# Patient Record
Sex: MALE | Race: WHITE | NOT HISPANIC OR LATINO | URBAN - METROPOLITAN AREA
[De-identification: names, ages, dates, MRNs, and addresses within clinical notes are randomized per-mention and may not be internally consistent; named-entity substitution may affect disease eponyms.]

---

## 2017-08-13 ENCOUNTER — OFFICE VISIT (OUTPATIENT)
Dept: URGENT CARE | Facility: URGENT CARE | Age: 32
End: 2017-08-13
Payer: COMMERCIAL

## 2017-08-13 VITALS
SYSTOLIC BLOOD PRESSURE: 124 MMHG | HEART RATE: 60 BPM | OXYGEN SATURATION: 97 % | DIASTOLIC BLOOD PRESSURE: 84 MMHG | WEIGHT: 256 LBS | RESPIRATION RATE: 16 BRPM | TEMPERATURE: 97.7 F

## 2017-08-13 DIAGNOSIS — H65.193 ACUTE EFFUSION OF BOTH MIDDLE EARS: Primary | ICD-10-CM

## 2017-08-13 PROCEDURE — 99202 OFFICE O/P NEW SF 15 MIN: CPT | Performed by: PHYSICIAN ASSISTANT

## 2017-08-13 RX ORDER — ALBUTEROL SULFATE 90 UG/1
2 AEROSOL, METERED RESPIRATORY (INHALATION) EVERY 6 HOURS
COMMUNITY

## 2017-08-13 ASSESSMENT — ENCOUNTER SYMPTOMS
FEVER: 0
CHILLS: 0
NAUSEA: 0
ABDOMINAL PAIN: 0
SHORTNESS OF BREATH: 0
VOMITING: 0
HEADACHES: 0
DIARRHEA: 0
FOCAL WEAKNESS: 0

## 2017-08-13 NOTE — MR AVS SNAPSHOT
"              After Visit Summary   2017    Alen Matthews    MRN: [id number]           Patient Information     Date Of Birth          1985        Visit Information        Provider Department      2017 1:25 PM Nicole Sanchez PA-C [facility]        Today's Diagnoses     Acute effusion of both middle ears    -  1       Follow-ups after your visit        Who to contact     If you have questions or need follow up information about today's clinic visit or your schedule please contact [facility] directly at [phone].  Normal or non-critical lab and imaging results will be communicated to you by [facility], letter or phone within 4 business days after the clinic has received the results. If you do not hear from us within 7 days, please contact the clinic through [facility] or phone. If you have a critical or abnormal lab result, we will notify you by phone as soon as possible.  Submit refill requests through [facility] or call your pharmacy and they will forward the refill request to us. Please allow 3 business days for your refill to be completed.          Additional Information About Your Visit        MyChart Information     [facility] lets you send messages to your doctor, view your test results, renew your prescriptions, schedule appointments and more. To sign up, go to [email or web address] . Click on \"Log in\" on the left side of the screen, which will take you to the Welcome page. Then click on \"Sign up Now\" on the right side of the page.     You will be asked to enter the access code listed below, as well as some personal information. Please follow the directions to create your username and password.     Your access code is: [email or web address]  Expires: 2017  1:51 PM     Your access code will  in 90 days. If you need help or a new code, please call your [place] clinic or [phone].        Care EveryWhere ID     This is your Care " EveryWhere ID. This could be used by other organizations to access your [facility] medical records  [id number]        Your Vitals Were     Pulse Temperature Respirations Pulse Oximetry          60 97.7  F (36.5  C) (Oral) 16 97%         Blood Pressure from Last 3 Encounters:   08/13/17 124/84    Weight from Last 3 Encounters:   08/13/17 256 lb (116.1 kg)              Today, you had the following     No orders found for display       Primary Care Provider Office Phone # Fax #    Irwin Monroy [phone]       [facility] [address] [place] [address]        Equal Access to Services     [facility]: Hadii aad ku hadasho Sodebora, waaxda luqadaha, qaybta kaalmada adezhouyada, mandeep gordon . So [facility] [phone].    ATENCIÓN: Si habla español, tiene a garcia disposición servicios gratuitos de asistencia lingüística. [facility] [phone].    We comply with applicable federal civil rights laws and [place] laws. We do not discriminate on the basis of race, color, national origin, age, disability sex, sexual orientation or gender identity.            Thank you!     Thank you for choosing [facility]  for your care. Our goal is always to provide you with excellent care. Hearing back from our patients is one way we can continue to improve our services. Please take a few minutes to complete the written survey that you may receive in the mail after your visit with us. Thank you!             Your Updated Medication List - Protect others around you: Learn how to safely use, store and throw away your medicines at www.disposemymeds.org.          This list is accurate as of: 8/13/17  1:51 PM.  Always use your most recent med list.                   Brand Name Dispense Instructions for use Diagnosis    albuterol 108 (90 BASE) MCG/ACT Inhaler    PROAIR HFA/PROVENTIL HFA/VENTOLIN HFA     Inhale 2 puffs into the lungs every 6 hours        OMEPRAZOLE  PO      Take 20 mg by mouth        SINGULAIR PO      Take 10 mg by mouth

## 2017-08-13 NOTE — NURSING NOTE
Chief Complaint   Patient presents with     Urgent Care     Ear Problem     X 1 day painful ears ache      /84  Pulse 60  Temp 97.7  F (36.5  C) (Oral)  Resp 16  Wt 256 lb (116.1 kg)  SpO2 97% There is no height or weight on file to calculate BMI.  bp completed using cuff size: regular       Health Maintenance addressed:  NONE    n/a    Kitty Fuentes MA

## 2018-06-22 ENCOUNTER — RECORDS - HEALTHEAST (OUTPATIENT)
Dept: LAB | Facility: CLINIC | Age: 33
End: 2018-06-22

## 2018-06-22 LAB
CHOLEST SERPL-MCNC: 205 MG/DL
FASTING STATUS PATIENT QL REPORTED: NO
HDLC SERPL-MCNC: 48 MG/DL
LDLC SERPL CALC-MCNC: 133 MG/DL
TRIGL SERPL-MCNC: 118 MG/DL

## 2019-12-06 ENCOUNTER — AMBULATORY - HEALTHEAST (OUTPATIENT)
Dept: MATERNAL FETAL MEDICINE | Facility: HOSPITAL | Age: 34
End: 2019-12-06

## 2019-12-06 ENCOUNTER — AMBULATORY - HEALTHEAST (OUTPATIENT)
Dept: LAB | Facility: HOSPITAL | Age: 34
End: 2019-12-06

## 2019-12-06 DIAGNOSIS — Z31.440 ENCOUNTER OF MALE FOR TESTING FOR GENETIC DISEASE CARRIER STATUS FOR PROCREATIVE MANAGEMENT: ICD-10-CM

## 2019-12-16 LAB — COUNSYL FORESIGHT CARRIER SCREEN: NORMAL

## 2019-12-17 ENCOUNTER — COMMUNICATION - HEALTHEAST (OUTPATIENT)
Dept: MATERNAL FETAL MEDICINE | Facility: HOSPITAL | Age: 34
End: 2019-12-17

## 2020-03-10 ENCOUNTER — RECORDS - HEALTHEAST (OUTPATIENT)
Dept: LAB | Facility: CLINIC | Age: 35
End: 2020-03-10

## 2020-03-10 LAB
CHOLEST SERPL-MCNC: 177 MG/DL
FASTING STATUS PATIENT QL REPORTED: YES
HDLC SERPL-MCNC: 43 MG/DL
LDLC SERPL CALC-MCNC: 112 MG/DL
TRIGL SERPL-MCNC: 110 MG/DL

## 2021-06-04 NOTE — PROGRESS NOTES
12/6/2019     Alen Matthews accompanied his wife Maria T Matthews to her appointment today in maternal fetal medicine.  Please see corresponding documentation for full details.  In order to clarify risks for her ongoing pregnancy and determine appropriate management, Alen had his blood drawn for expanded carrier screening ([facility] Panel through [employer]).  Alen and Maria T will be contacted to discuss results when they are available.  Alen completed a consent to communicate with Maria T regarding these results, and requests that detailed results information be left in their voicemails if they do not answer.     Seth Syed MS, [facility]  Licensed Genetic Counselor  Phone: [phone]  Pager: [phone]

## 2021-06-04 NOTE — TELEPHONE ENCOUNTER
12/17/2019    Called Murali to discuss results from his expanded carrier screening.  Results are negative for all 175 conditions assessed.  This indicates that Murali is at very low risk for being a carrier for any of these conditions, and that his partner Maria T's ongoing pregnancy is also considered low risk for having any of the conditions assessed.  This information was left in Murali's voicemail as requested, and he was encouraged to contact me with any questions or concerns.     Seth Syed MS, [facility]  Licensed Genetic Counselor  Phone: [phone]  Pager: [phone]

## 2022-03-10 ENCOUNTER — TELEPHONE (OUTPATIENT)
Dept: FAMILY MEDICINE | Facility: CLINIC | Age: 37
End: 2022-03-10
Payer: COMMERCIAL

## 2022-03-10 NOTE — TELEPHONE ENCOUNTER
Reason for Call:  Other appointment    Detailed comments: Alen was referred by one of his friends that has seen Dr. Moctezuma and he is wanting to scheduling a consult for vasectomy with Dr. Moctezuma but he is not establish care with [facility].     Would Dr. Moctezuma be willing to see him for a consult?    Phone Number Patient can be reached at: Home number on file [phone] (home)    Best Time: any      Can we leave a detailed message on this number? YES    Call taken on 3/10/2022 at 1:25 PM by Danita Cunningham